# Patient Record
Sex: FEMALE | Race: WHITE | NOT HISPANIC OR LATINO | URBAN - METROPOLITAN AREA
[De-identification: names, ages, dates, MRNs, and addresses within clinical notes are randomized per-mention and may not be internally consistent; named-entity substitution may affect disease eponyms.]

---

## 2025-05-14 ENCOUNTER — APPOINTMENT (OUTPATIENT)
Dept: OBSTETRICS AND GYNECOLOGY | Facility: CLINIC | Age: 25
End: 2025-05-14

## 2025-05-14 VITALS — BODY MASS INDEX: 39.78 KG/M2 | DIASTOLIC BLOOD PRESSURE: 65 MMHG | WEIGHT: 221 LBS | SYSTOLIC BLOOD PRESSURE: 112 MMHG

## 2025-05-14 DIAGNOSIS — Z36.4 ULTRASOUND FOR ANTENATAL SCREENING FOR FETAL GROWTH RESTRICTION (HHS-HCC): ICD-10-CM

## 2025-05-14 DIAGNOSIS — O36.80X0 PREGNANCY WITH INCONCLUSIVE FETAL VIABILITY, SINGLE OR UNSPECIFIED FETUS: ICD-10-CM

## 2025-05-14 DIAGNOSIS — N91.2 AMENORRHEA: ICD-10-CM

## 2025-05-14 DIAGNOSIS — Z32.01 PREGNANCY TEST POSITIVE (HHS-HCC): ICD-10-CM

## 2025-05-14 DIAGNOSIS — Z34.82 PRENATAL CARE, SUBSEQUENT PREGNANCY IN SECOND TRIMESTER: ICD-10-CM

## 2025-05-14 DIAGNOSIS — O99.210 OBESITY IN PREGNANCY (HHS-HCC): Primary | ICD-10-CM

## 2025-05-14 LAB
POC BLOOD, URINE: NEGATIVE
POC GLUCOSE, URINE: NEGATIVE MG/DL
POC LEUKOCYTES, URINE: NEGATIVE
POC NITRITE,URINE: NEGATIVE
POC PROTEIN, URINE: NEGATIVE MG/DL
PREGNANCY TEST URINE, POC: POSITIVE

## 2025-05-14 PROCEDURE — 81025 URINE PREGNANCY TEST: CPT | Performed by: OBSTETRICS & GYNECOLOGY

## 2025-05-14 PROCEDURE — 99204 OFFICE O/P NEW MOD 45 MIN: CPT | Performed by: OBSTETRICS & GYNECOLOGY

## 2025-05-14 PROCEDURE — 76816 OB US FOLLOW-UP PER FETUS: CPT | Performed by: OBSTETRICS & GYNECOLOGY

## 2025-05-14 NOTE — PROGRESS NOTES
"Subjective   Patient ID: Abbey Frank is a 24 y.o. female who presents for confirmation of pregnancy.  New patient 24 years old.  Here with her partner.  Confirmation of pregnancy fetal viability establishment of due date and pregnancy recommendations    First of her last menses was .  She had an ultrasound done at [facility] end of April she has video but they did not do measurements.  Based on my visualization of the video she looks to be about 10 to 12 weeks at that time.  I did not ultrasound today she measuring between 13.4 and 13.6 weeks based on biparietal diameter and femur length.  Due date established for     Will review pregnancy recommendations.  Start baby aspirin once daily along with prenatal vitamins to minimize risk of preeclampsia obtain prenatal blood work.  Counseled on noninvasive prenatal testing.  Reviewed the importance of limiting weight gain.        Review of Systems   All other systems reviewed and are negative.      Objective   Physical Exam  Constitutional:       Appearance: Normal appearance. She is obese.   Neurological:      Mental Status: She is alert.         Assessment/Plan   Confirmation of fetal viability review pregnancy recommendations obtain prenatal blood work.  Consider noninvasive prenatal testing.         Ronald Fuller MD 25 1:35 PM Subjective   Patient ID [id number]   Abbye Frank is a 24 y.o.  at 13w6d with a working estimated date of delivery of 2025, by Ultrasound who presents for a routine prenatal visit. She denies vaginal bleeding, leakage of fluid, decreased fetal movements, and contractions.    Her pregnancy is complicated by: BMI greater than 30.    Objective   Physical Exam  Weight: 100 kg (221 lb), Pregravid BMI: Could not be calculated  Expected Total Weight Gain: Could not be calculated   BP: 112/65         Prenatal Labs  Urine dip:  No results found for: \"KETONESU\", \"GLUCOSEUR\", \"LEUKOCYTESUR\"  No results found " "for: \"HGB\", \"HCT\", \"LABPLAT\", \"ABO\", \"LABRHF\", \"LABANTI\", \"LABRPR\", \"HEPBSAG\", \"HIVAB\"  No results found for: \"PAPPA\", \"AFP\", \"HCG\", \"ESTRIOL\", \"INHBA\"    Imaging  The most recent ultrasound was performed on   with a study GA of  .          Assessment/Plan       Continue prenatal vitamin.  Labs reviewed.  Order placed for anatomy scan at 20 weeks.  Prenatal blood work.  Counseled on noninvasive prenatal testing to review pregnancy recommendations.  Start baby aspirin  Follow up in 4 weeks for a routine prenatal visit.  "

## 2025-05-16 LAB — BACTERIA UR CULT: NORMAL

## 2025-05-18 LAB
AMPHETAMINES UR QL: NEGATIVE NG/ML
BACTERIA UR CULT: NORMAL
BARBITURATES UR QL: NEGATIVE NG/ML
BENZODIAZ UR QL: NEGATIVE NG/ML
BZE UR QL: NEGATIVE NG/ML
C TRACH RRNA SPEC QL NAA+PROBE: NOT DETECTED
CODEINE UR-MCNC: 385 NG/ML
CREAT UR-MCNC: 221 MG/DL
DRUG SCREEN COMMENT UR-IMP: ABNORMAL
FENTANYL UR QL SCN: NEGATIVE NG/ML
HYDROCODONE UR-MCNC: NEGATIVE NG/ML
HYDROMORPHONE UR-MCNC: NEGATIVE NG/ML
METHADONE UR QL: NEGATIVE NG/ML
MORPHINE UR-MCNC: 55 NG/ML
N GONORRHOEA RRNA SPEC QL NAA+PROBE: NOT DETECTED
NORHYDROCODONE UR CFM-MCNC: NEGATIVE NG/ML
OPIATES UR QL: POSITIVE NG/ML
OXIDANTS UR QL: NEGATIVE MCG/ML
OXYCODONE UR QL: NEGATIVE NG/ML
PCP UR QL: NEGATIVE NG/ML
PH UR: 5.6 [PH] (ref 4.5–9)
QUEST GC CT AMPLIFIED (ALWAYS MESSAGE): NORMAL
QUEST NOTES AND COMMENTS: ABNORMAL
THC UR QL: NEGATIVE NG/ML

## 2025-05-27 ENCOUNTER — TELEPHONE (OUTPATIENT)
Dept: OBSTETRICS AND GYNECOLOGY | Facility: CLINIC | Age: 25
End: 2025-05-27
Payer: MEDICAID

## 2025-05-28 ENCOUNTER — LAB (OUTPATIENT)
Dept: LAB | Facility: HOSPITAL | Age: 25
End: 2025-05-28
Payer: MEDICAID

## 2025-05-28 DIAGNOSIS — Z34.82 ENCOUNTER FOR SUPERVISION OF OTHER NORMAL PREGNANCY, SECOND TRIMESTER: Primary | ICD-10-CM

## 2025-05-28 LAB
ABO GROUP (TYPE) IN BLOOD: NORMAL
ANTIBODY SCREEN: NORMAL
ERYTHROCYTE [DISTWIDTH] IN BLOOD BY AUTOMATED COUNT: 12.9 % (ref 11.5–14.5)
HBV SURFACE AG SERPL QL IA: NONREACTIVE
HCT VFR BLD AUTO: 34 % (ref 36–46)
HCV AB SER QL: NONREACTIVE
HGB BLD-MCNC: 11.4 G/DL (ref 12–16)
HIV 1+2 AB+HIV1 P24 AG SERPL QL IA: NONREACTIVE
MCH RBC QN AUTO: 29.2 PG (ref 26–34)
MCHC RBC AUTO-ENTMCNC: 33.5 G/DL (ref 32–36)
MCV RBC AUTO: 87 FL (ref 80–100)
NRBC BLD-RTO: 0 /100 WBCS (ref 0–0)
PLATELET # BLD AUTO: 221 X10*3/UL (ref 150–450)
RBC # BLD AUTO: 3.9 X10*6/UL (ref 4–5.2)
REFLEX ADDED, ANEMIA PANEL: NORMAL
RH FACTOR (ANTIGEN D): NORMAL
RUBV IGG SERPL IA-ACNC: 1.1 IA
RUBV IGG SERPL QL IA: POSITIVE
TREPONEMA PALLIDUM IGG+IGM AB [PRESENCE] IN SERUM OR PLASMA BY IMMUNOASSAY: NONREACTIVE
WBC # BLD AUTO: 8.1 X10*3/UL (ref 4.4–11.3)

## 2025-05-28 PROCEDURE — 86901 BLOOD TYPING SEROLOGIC RH(D): CPT

## 2025-05-28 PROCEDURE — 86900 BLOOD TYPING SEROLOGIC ABO: CPT

## 2025-05-28 PROCEDURE — 85027 COMPLETE CBC AUTOMATED: CPT

## 2025-05-28 PROCEDURE — 86762 RUBELLA ANTIBODY: CPT

## 2025-05-28 PROCEDURE — 86780 TREPONEMA PALLIDUM: CPT

## 2025-05-28 PROCEDURE — 87340 HEPATITIS B SURFACE AG IA: CPT

## 2025-05-28 PROCEDURE — 87389 HIV-1 AG W/HIV-1&-2 AB AG IA: CPT

## 2025-05-28 PROCEDURE — 86850 RBC ANTIBODY SCREEN: CPT

## 2025-05-28 PROCEDURE — 36415 COLL VENOUS BLD VENIPUNCTURE: CPT

## 2025-05-28 PROCEDURE — 86803 HEPATITIS C AB TEST: CPT

## 2025-05-30 LAB
# FETUSES US: NORMAL
AFP ADJ MOM SERPL: NORMAL
AFP INTERP SERPL-IMP: NORMAL
AFP SERPL-MCNC: NORMAL NG/ML
AGE: NORMAL
CLINICAL INFO: NORMAL
DONATED EGG PATIENT QL: NORMAL
EST. AVERAGE GLUCOSE BLD GHB EST-MCNC: 94 MG/DL
EST. AVERAGE GLUCOSE BLD GHB EST-SCNC: 5.2 MMOL/L
GA CLIN EST: NORMAL WK
GA METHOD: NORMAL
HBA1C MFR BLD: 4.9 %
HX OF NTD NARR: NORMAL
HX OF TRISOMY 21 NARR: NORMAL
IDDM PATIENT QL: NORMAL
NEURAL TUBE DEFECT RISK FETUS: NORMAL %
SERVICE CMNT-IMP: NORMAL
SERVICE CMNT-IMP: NORMAL

## 2025-06-05 LAB
# FETUSES US: 1
AFP ADJ MOM SERPL: 0.53
AFP INTERP SERPL-IMP: NORMAL
AFP SERPL-MCNC: 13.8 NG/ML
AGE: NORMAL
CLINICAL INFO: NO
DONATED EGG PATIENT QL: NO
EST. AVERAGE GLUCOSE BLD GHB EST-MCNC: 94 MG/DL
EST. AVERAGE GLUCOSE BLD GHB EST-SCNC: 5.2 MMOL/L
GA CLIN EST: 15.9 WEEKS
GA METHOD: NORMAL
HBA1C MFR BLD: 4.9 %
HX OF NTD NARR: NO
HX OF TRISOMY 21 NARR: NO
IDDM PATIENT QL: NO
NEURAL TUBE DEFECT RISK FETUS: NORMAL %
SERVICE CMNT-IMP: NORMAL
SERVICE CMNT-IMP: NORMAL

## 2025-06-12 PROBLEM — Z3A.18 18 WEEKS GESTATION OF PREGNANCY (HHS-HCC): Status: ACTIVE | Noted: 2025-06-12

## 2025-06-14 ENCOUNTER — APPOINTMENT (OUTPATIENT)
Dept: OBSTETRICS AND GYNECOLOGY | Facility: CLINIC | Age: 25
End: 2025-06-14
Payer: MEDICAID

## 2025-06-14 VITALS — BODY MASS INDEX: 40.68 KG/M2 | DIASTOLIC BLOOD PRESSURE: 71 MMHG | WEIGHT: 226 LBS | SYSTOLIC BLOOD PRESSURE: 111 MMHG

## 2025-06-14 DIAGNOSIS — Z3A.18 18 WEEKS GESTATION OF PREGNANCY (HHS-HCC): Primary | ICD-10-CM

## 2025-06-14 DIAGNOSIS — Z3A.20 20 WEEKS GESTATION OF PREGNANCY (HHS-HCC): ICD-10-CM

## 2025-06-14 DIAGNOSIS — Z34.82 PRENATAL CARE, SUBSEQUENT PREGNANCY IN SECOND TRIMESTER: ICD-10-CM

## 2025-06-14 PROCEDURE — 99213 OFFICE O/P EST LOW 20 MIN: CPT | Performed by: MIDWIFE

## 2025-06-14 NOTE — PROGRESS NOTES
"Subjective   Patient ID [id number]   Abbey Frank is a 24 y.o.  at 18w2d with a working estimated date of delivery of 2025, by Ultrasound who presents for a routine prenatal visit. She denies vaginal bleeding, leakage of fluid, decreased fetal movements, or contractions.    Her pregnancy is complicated by:  BMI greater than 30.  Weekly NST 34 weeks; Toxicology screening 25 positive for opiates/codeine/morphine.  Blood type A-.  Female.  RHD detected for RhoGAM    Objective   Physical Exam  Weight: 103 kg (226 lb)  Expected Total Weight Gain: Could not be calculated   Pregravid BMI: Could not be calculated  BP: 111/71  Fetal Heart Rate: 147      Prenatal Labs  Urine dip:  No results found for: \"KETONESU\", \"GLUCOSEUR\", \"LEUKOCYTESUR\"    Lab Results   Component Value Date    HGB 11.4 (L) 2025    HCT 34.0 (L) 2025    ABO A 2025    HEPBSAG Nonreactive 2025     Lab Results   Component Value Date    AFP 13.8 2025     No results found for: \"GLUF\", \"GLUT1\", \"USZAVCK5BP\", \"DBOAENW5NV\"    Imaging  The most recent ultrasound was performed on The most recent ultrasound study is not finalized with a study GA of The most recent ultrasound study is not finalized and EFW of The most recent ultrasound study is not finalized.  The most recent ultrasound study is not finalized  The most recent ultrasound study is not finalized    Assessment/Plan   Diagnoses and all orders for this visit:  18 weeks gestation of pregnancy ([facility])  20 weeks gestation of pregnancy ([facility])  -     US MAC OB imaging order; Future  Prenatal care, subsequent pregnancy in second trimester      Continue prenatal vitamin.  Labs reviewed.  RhoGAM to be given at 28 wks  20 wk anatomy US to be scheduled  Warning s/sx reviewed  Follow up in 4 weeks for a routine prenatal visit.  "

## 2025-06-30 ENCOUNTER — HOSPITAL ENCOUNTER (OUTPATIENT)
Dept: RADIOLOGY | Facility: CLINIC | Age: 25
Discharge: HOME | End: 2025-06-30
Payer: MEDICAID

## 2025-06-30 DIAGNOSIS — Z3A.20 20 WEEKS GESTATION OF PREGNANCY (HHS-HCC): ICD-10-CM

## 2025-06-30 PROCEDURE — 76811 OB US DETAILED SNGL FETUS: CPT | Performed by: STUDENT IN AN ORGANIZED HEALTH CARE EDUCATION/TRAINING PROGRAM

## 2025-06-30 PROCEDURE — 76811 OB US DETAILED SNGL FETUS: CPT

## 2025-07-14 ENCOUNTER — APPOINTMENT (OUTPATIENT)
Dept: OBSTETRICS AND GYNECOLOGY | Facility: CLINIC | Age: 25
End: 2025-07-14
Payer: MEDICAID

## 2025-07-14 VITALS — DIASTOLIC BLOOD PRESSURE: 72 MMHG | WEIGHT: 226 LBS | SYSTOLIC BLOOD PRESSURE: 112 MMHG | BODY MASS INDEX: 40.68 KG/M2

## 2025-07-14 DIAGNOSIS — O26.899 RH NEGATIVE STATE IN ANTEPARTUM PERIOD (HHS-HCC): ICD-10-CM

## 2025-07-14 DIAGNOSIS — Z67.91 RH NEGATIVE STATE IN ANTEPARTUM PERIOD (HHS-HCC): ICD-10-CM

## 2025-07-14 DIAGNOSIS — Z51.81 ENCOUNTER FOR THERAPEUTIC DRUG MONITORING: ICD-10-CM

## 2025-07-14 DIAGNOSIS — Z3A.22 22 WEEKS GESTATION OF PREGNANCY (HHS-HCC): Primary | ICD-10-CM

## 2025-07-14 DIAGNOSIS — Z34.83 PRENATAL CARE, SUBSEQUENT PREGNANCY IN THIRD TRIMESTER: ICD-10-CM

## 2025-07-14 LAB
POC BLOOD, URINE: NEGATIVE
POC GLUCOSE, URINE: NEGATIVE MG/DL
POC LEUKOCYTES, URINE: NEGATIVE
POC NITRITE,URINE: NEGATIVE
POC PROTEIN, URINE: NEGATIVE MG/DL

## 2025-07-14 PROCEDURE — 99213 OFFICE O/P EST LOW 20 MIN: CPT | Performed by: MIDWIFE

## 2025-07-14 RX ORDER — ASPIRIN 81 MG/1
81 TABLET ORAL DAILY
COMMUNITY

## 2025-07-14 RX ORDER — FERROUS SULFATE 325(65) MG
325 TABLET ORAL
COMMUNITY

## 2025-07-14 NOTE — PROGRESS NOTES
"Subjective   Patient ID [id number]   Abbey Frank is a 24 y.o.  at 22w4d with a working estimated date of delivery of 2025, by Ultrasound who presents for a routine prenatal visit. She denies vaginal bleeding, leakage of fluid, decreased fetal movements, or contractions.    Her pregnancy is complicated by:  BMI greater than 30.  Weekly NST 34 weeks; Toxicology screening 25 positive for opiates/codeine/morphine.  Blood type A-.  Female.  RHD detected for RhoGAM. Change toni to     Objective   Physical Exam  Weight: 103 kg (226 lb)  Expected Total Weight Gain: Could not be calculated   Pregravid BMI: Could not be calculated  BP: 112/72  Fetal Heart Rate: 134 Fundal Height (cm): 22 cm    Prenatal Labs  Urine dip:  No results found for: \"KETONESU\", \"GLUCOSEUR\", \"LEUKOCYTESUR\"    Lab Results   Component Value Date    HGB 11.4 (L) 2025    HCT 34.0 (L) 2025    ABO A 2025    HEPBSAG Nonreactive 2025     Lab Results   Component Value Date    AFP 13.8 2025     No results found for: \"GLUF\", \"GLUT1\", \"RUKFPTZ5GS\", \"TMEDGYH3KH\"    Imaging  The most recent ultrasound was performed on The most recent ultrasound study is not finalized with a study GA of The most recent ultrasound study is not finalized and EFW of The most recent ultrasound study is not finalized.  The most recent ultrasound study is not finalized  The most recent ultrasound study is not finalized    Assessment/Plan   Diagnoses and all orders for this visit:  22 weeks gestation of pregnancy ([facility])  Prenatal care, subsequent pregnancy in third trimester  -     Hemoglobin and Hematocrit, Blood; Future  -     Glucose, 1 Hour Screen, Pregnancy; Future  -     POCT UA (nonautomated) manually resulted  Rh negative state in antepartum period ([facility])  -     Type And Screen Is this order related to pregnancy or an upcoming surgery? Yes; Where will this surgery/delivery be performed? [facility]; What is the " date of the surgery? 11/13/2025; Has this patient ever had a transfusion? Unknown; Ha...; Future      Continue prenatal vitamin.  Labs reviewed.  GTT ordered with H/H.  Importance of daily FM, warning s/sx reviewed  Follow up in 4 weeks for a routine prenatal visit.

## 2025-07-15 LAB
AMPHETAMINES UR QL: NEGATIVE NG/ML
BARBITURATES UR QL: NEGATIVE NG/ML
BENZODIAZ UR QL: NEGATIVE NG/ML
BZE UR QL: NEGATIVE NG/ML
CREAT UR-MCNC: 132.1 MG/DL
FENTANYL UR QL SCN: NEGATIVE NG/ML
METHADONE UR QL: NEGATIVE NG/ML
OPIATES UR QL: NEGATIVE NG/ML
OXIDANTS UR QL: NEGATIVE MCG/ML
OXYCODONE UR QL: NEGATIVE NG/ML
PCP UR QL: NEGATIVE NG/ML
PH UR: 7.8 [PH] (ref 4.5–9)
QUEST NOTES AND COMMENTS: NORMAL
THC UR QL: NEGATIVE NG/ML

## 2025-07-21 ENCOUNTER — ANCILLARY PROCEDURE (OUTPATIENT)
Dept: RADIOLOGY | Age: 25
End: 2025-07-21
Payer: MEDICAID

## 2025-07-21 DIAGNOSIS — Z3A.20 20 WEEKS GESTATION OF PREGNANCY (HHS-HCC): ICD-10-CM

## 2025-07-21 PROCEDURE — 76816 OB US FOLLOW-UP PER FETUS: CPT

## 2025-07-21 PROCEDURE — 76816 OB US FOLLOW-UP PER FETUS: CPT | Performed by: MEDICAL GENETICS

## 2025-07-22 ENCOUNTER — TELEPHONE (OUTPATIENT)
Dept: OBSTETRICS AND GYNECOLOGY | Facility: HOSPITAL | Age: 25
End: 2025-07-22
Payer: MEDICAID

## 2025-07-25 DIAGNOSIS — Z34.83 PRENATAL CARE, SUBSEQUENT PREGNANCY IN THIRD TRIMESTER: ICD-10-CM

## 2025-07-25 DIAGNOSIS — Z67.91 RH NEGATIVE STATE IN ANTEPARTUM PERIOD (HHS-HCC): ICD-10-CM

## 2025-07-25 DIAGNOSIS — O26.899 RH NEGATIVE STATE IN ANTEPARTUM PERIOD (HHS-HCC): ICD-10-CM

## 2025-08-04 ENCOUNTER — OFFICE VISIT (OUTPATIENT)
Dept: PEDIATRIC CARDIOLOGY | Facility: HOSPITAL | Age: 25
End: 2025-08-04
Payer: MEDICAID

## 2025-08-04 ENCOUNTER — LAB (OUTPATIENT)
Dept: LAB | Facility: HOSPITAL | Age: 25
End: 2025-08-04
Payer: MEDICAID

## 2025-08-04 ENCOUNTER — HOSPITAL ENCOUNTER (OUTPATIENT)
Dept: PEDIATRIC CARDIOLOGY | Facility: HOSPITAL | Age: 25
Discharge: HOME | End: 2025-08-04
Payer: MEDICAID

## 2025-08-04 VITALS
BODY MASS INDEX: 41.21 KG/M2 | WEIGHT: 232.59 LBS | SYSTOLIC BLOOD PRESSURE: 109 MMHG | HEART RATE: 94 BPM | OXYGEN SATURATION: 96 % | HEIGHT: 63 IN | DIASTOLIC BLOOD PRESSURE: 71 MMHG

## 2025-08-04 DIAGNOSIS — O26.899 OTHER SPECIFIED PREGNANCY RELATED CONDITIONS, UNSPECIFIED TRIMESTER (HHS-HCC): ICD-10-CM

## 2025-08-04 DIAGNOSIS — O35.9XX0 SUSPECTED FETAL ABNORMALITY AFFECTING MANAGEMENT OF MOTHER, SINGLE OR UNSPECIFIED FETUS (HHS-HCC): Primary | ICD-10-CM

## 2025-08-04 DIAGNOSIS — Z67.91 UNSPECIFIED BLOOD TYPE, RH NEGATIVE: Primary | ICD-10-CM

## 2025-08-04 DIAGNOSIS — O35.9XX1 SUSPECTED FETAL ANOMALY, ANTEPARTUM, FETUS 1 (HHS-HCC): ICD-10-CM

## 2025-08-04 DIAGNOSIS — O35.8XX0 MATERNAL CARE FOR OTHER (SUSPECTED) FETAL ABNORMALITY AND DAMAGE, NOT APPLICABLE OR UNSPECIFIED (HHS-HCC): ICD-10-CM

## 2025-08-04 LAB — BODY SURFACE AREA: 2.17 M2

## 2025-08-04 PROCEDURE — 93325 DOPPLER ECHO COLOR FLOW MAPG: CPT | Performed by: PEDIATRICS

## 2025-08-04 PROCEDURE — 3008F BODY MASS INDEX DOCD: CPT | Performed by: PEDIATRICS

## 2025-08-04 PROCEDURE — 76827 ECHO EXAM OF FETAL HEART: CPT | Performed by: PEDIATRICS

## 2025-08-04 PROCEDURE — 76825 ECHO EXAM OF FETAL HEART: CPT

## 2025-08-04 PROCEDURE — 86901 BLOOD TYPING SEROLOGIC RH(D): CPT | Performed by: OBSTETRICS & GYNECOLOGY

## 2025-08-04 PROCEDURE — 76825 ECHO EXAM OF FETAL HEART: CPT | Performed by: PEDIATRICS

## 2025-08-04 PROCEDURE — 99202 OFFICE O/P NEW SF 15 MIN: CPT

## 2025-08-04 PROCEDURE — 99244 OFF/OP CNSLTJ NEW/EST MOD 40: CPT | Performed by: PEDIATRICS

## 2025-08-04 NOTE — PATIENT INSTRUCTIONS
On fetal echocardiogram today the structure, size, function (squeeze), and rhythm of your fetus' heart were normal.  There are some limitations to fetal echocardiogram as described below, and because it is not a perfect test it is important to know that we cannot rule out all possible heart problems (see below).  We will communicate these results with your obstetrician.    It was a pleasure to see you today.  We do not need to see you back for routine follow-up during the remainder of your pregnancy.  However, we would be happy to see you back if new issues or concerns arise.  After birth your baby does not need to see a cardiologist unless the pediatric team has concerns.  If you have any questions or concerns regarding this evaluation, please do not hesitate to contact me.    Valery Wyman MD   of Pediatrics  Division of Pediatric Cardiology  [place] [address]  Phone: [phone]  Fax: [phone]  e-mail: [email or web address]    [email or web address]    Normal Fetal Echocardiogram    Today you had an ultrasound of your fetus' heart (fetal echocardiogram).  Based on all of the pictures taken today, the heart appears normal and is developing as expected for this point in your pregnancy.   Therefore, no further testing is recommended at this time.    Despite today´s normal findings, it is impossible to rule out all heart problems before birth.  This is partially because the fetus´ heart is so small, and our machine´s technology can only see structures down to a certain size.  It is also because blood flow in a fetus is different and more complicated than blood flow after birth.    Briefly:  ° Fetuses get oxygen from the placenta instead of their lungs.  High-oxygen (red) blood from the placenta comes back to the right side of the fetus´  heart.  ° Red blood crosses to the left side of the heart through a hole between the upper chambers of the heart, the foramen ovale.  The foramen ovale lets the red blood flow to the body.  ° Low-oxygen (blue) blood stays on the right side of the heart.  After leaving the heart, the blue blood flows through a special blood vessel known as the ductus arteriosus.  The ductus arteriosus allows the blue blood to bypass the lungs and return to the placenta to get oxygen.  ° After birth the foramen ovale and ductus arteriosus should close, but sometimes they do not.  It is impossible to predict in which children these structures will remain open.    ° Thus, on fetal echo we cannot rule out that your baby will have a patent foramen ovale (PFO) or patent ductus arteriosus (PDA) after birth.    In addition, fetal echocardiography can miss other heart defects including:  ° Small or medium-sized holes between the upper or lower heart chambers.  ° Minor abnormalities of the heart valves.  ° Narrowing of the main artery that goes to the body (coarctation of the aorta).  ° Other rare abnormalities of the veins or arteries.    If any of these defects are present, there should be findings after birth that will alert your child´s doctor that there is a problem and prompt further evaluation.  After delivery, if your pediatrician has any concerns, your child's heart can be reevaluated at that time.

## 2025-08-04 NOTE — PROGRESS NOTES
[facility]  [facility]  Division of Pediatric Cardiology  [facility]  [address], [address], [place] [address]  Tel: [phone], Fax [phone]      Obstetrician: Dr. Ronald Fuller     Abbey Frank was seen at the request of Dr. Hillary Llanes for incomplete cardiac views on obstetric ultrasound.  Records were reviewed, and a summary of those records is integrated within the history of present illness.  A report with my findings is being sent via written or electronic means to the referring physician with my recommendations.    Accompanied by: friend   History obtained from: patient    History of Presentation   History of Present Illness:   Abbey Frank is a 24 y.o. female presenting for initial prenatal cardiology consultation and fetal echocardiogram for incomplete cardiac views on obstetric ultrasound.  She is  and approximately 25w4d weeks pregnant (No LMP recorded. Patient is pregnant., Estimated Date of Delivery: 25) with a Female fetus.  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  Complications of her current pregnancy include obesity (BMI 41).  Her level 2 obstetric ultrasound for anatomy was performed at 19 weeks gestational age and demonstrated incomplete 4 chamber heart, outflow tracts, 3 vessel view, nose/lips, left diaphragm and bilateral angles. A repeat obstetric ultrasound at 22 weeks was completed except for 4 chamber and outflow tract views and was normal. She has undergone cell free fetal DNA testing and it was normal.  She has not had invasive genetic testing such as amniocentesis or chorionic villous sampling.  This pregnancy was not the result of in vitro fertilization.  She was not using potentially teratogenic medication at the time of conception.  There have been no other complications of this pregnancy.  Ms. Potter  "NANI Frank plans to deliver her baby at [facility].    Ms. Abbey Frank feels well today.  She denies any shortness of breath, abdominal cramping, contractions, bleeding, or swelling of the extremities.  She notes frequent fetal movement.      Medical History     Medical Conditions:  Problem List[1]  Past Surgeries:  Surgical History[2]    Current Outpatient Medications   Medication Instructions    aspirin 81 mg, Daily    ferrous sulfate 325 mg (65 mg elemental) tablet 325 mg of ferrous sulfate, Daily with breakfast    prenatal no115/iron/folic acid (PRENATAL 19 ORAL) Take by mouth.      Allergies:  Patient has no known allergies.    Social History:  Patient lives with significant other.  Occupation: Not employed   Smoking: None  Alcohol: None  Drug Use: None  She wears a seatbelt while in the car. She denies any verbal, sexual or physical abuse.     Cardiac Family History (for patient and father of baby):  Her mother has a history of an arrhythmia. There is no history of congenital heart disease.  There is no history of early sudden/unexplained death including SIDS and drowning.  There is no history of cardiomyopathy of any type or heart transplant.  There is no history of pacemaker/defibrillator or arrhythmia syndromes, including Long QT syndrome, Dulce Maria-Parkinson-White syndrome or Brugada syndrome.  There is no history of heart attack or stroke before the age of 55 years in a close family member.  There is no history of Marfan syndrome or aortic aneurysm.  There is no history of deafness.  There is no history of syncope/fainting.  There is no history of high blood pressure or high cholesterol.  There is no history of DiGeorge Syndrome (22q11).    Physical Examination     /71 (BP Location: Right arm, Patient Position: Sitting)   Pulse 94   Ht 1.61 m (5' 3.39\")   Wt 105 kg (232 lb 9.4 oz)   SpO2 96%   BMI 40.70 kg/m²     General: Alert, well-appearing and in no acute distress.    Abdomen: " Soft, nontender, not distended. Gravid.  Extremities: No swelling or edema.  Neurologic / Psychiatric: Grossly intact without focal deficits.  Appropriate demeanor.      Results     Fetal Echocardiogram:    I ordered and interpreted a transabdominal fetal echocardiogram.  I performed a portion of the study myself.  The complete report is available under separate cover.  The results are summarized as follows:    Image quality: Good  Cardiac situs: Cardiac mass in the left chest.  Left ventricular apex points leftward.  Segmental anatomy: Atrio-ventricular concordance.  Ventriculo-arterial concordance.  Normally-related great arteries.  Superior vena cava: Normal connection to the right atrium.  Inferior vena cava: Normal connection to the right atrium.  Pulmonary veins: At least one pulmonary vein connects normally to the left atrium.  Atrial septum: Patent foramen ovale, with flap valve bowing into the left atrium.  Tricuspid valve: Structurally normal.  No obvious stenosis or insufficiency.  Mitral valve: Structurally normal.  No obvious stenosis or insufficiency.  Right ventricle: Normal ventricular size, wall thickness, and systolic function (qualitative).  Left ventricle: Normal ventricular size, wall thickness, and systolic function (qualitative).  Interventricular septum: No obvious septal defect.  Aortic valve: Structurally normal.  No obvious stenosis or insufficiency.  Pulmonary valve: Structurally normal.  No obvious stenosis or insufficiency.  Pulmonary artery: Normal in size.  Ductal arch: Patent with right to left shunting.  Aortic arch: Left-sided.  Patent.  Pericardial effusion: None.  Umbilical arteries: Two umbilical arteries.  Normal arterial flow pattern.  Umbilical vein: Normal venous flow pattern.  Electrophysiology: Normal fetal heart rate and rhythm.  Normal mechanical AL interval.      Assessment & Plan   Assessment:  Ms. Abbey Frank is a 24 y.o. female who is  and currently at  25w4d weeks gestational age with a female fetus.  A fetal echocardiogram was performed today for incomplete cardiac views on obstetric ultrasound.     Fetal echocardiogram today demonstrated grossly normal fetal cardiac anatomy, qualitatively normal fetal cardiac function, normal fetal heart rhythm, and no evidence of in utero congestive heart failure or hydrops fetalis.  I reviewed the results of today's evaluation, including the findings of the fetal echocardiogram, with Ms. Abbey Frank in detail.      I discussed limitations of the technology of fetal echocardiography with Ms. Abbey Frank in detail.  I explained that fetal echocardiography cannot exclude all forms of congenital heart disease.  Fetal echocardiography may be insensitive to some defects of atrial and ventricular septation, minor valvular abnormalities, partial anomalous pulmonary venous return, and coarctation of the aorta.  In addition, normal fetal echocardiogram does not ensure that the fetal ductus arteriosus or foramen ovale will close.      Recommendations:  No changes to prenatal care.    Further fetal cardiac imaging is not required at this time.  We would be happy to see Ms. Abbey Frank in the future if new concerns arise.    Triage code 0:        No changes to delivery planning.  Delivery per obstetrics at patient´s preferred hospital.  Standard  care per  team.        No pediatric cardiology consult needed after birth unless there are concerns/issues.    I spent greater than 60 minutes in performance of this consultation, of which greater than 50% was related to coordination of care or counseling.    This assessment and plan, in addition to the results of relevant testing were explained to Abbey. All questions were answered and understanding was demonstrated.    It was a pleasure to see Ms. Abbey Frank today.  If you have any questions or concerns regarding this evaluation, do not hesitate to contact me.       Valery Wyman MD, FACC, FAAP   of Pediatrics  Division of Pediatric Cardiology  [place] [address]  Phone: [phone]  Fax: [phone]  e-mail: [email or web address]         [1]   Patient Active Problem List  Diagnosis    18 weeks gestation of pregnancy ([facility])   [2] No past surgical history on file.

## 2025-08-04 NOTE — LETTER
2025     Hillary Llanes MD  [address] [place] [address]    Patient: Abbey Frank   YOB: 2000   Date of Visit: 2025       Dear Dr. Hillary Llanes MD:    Thank you for referring Abbey Frank to me for evaluation. Below are my notes for this consultation.  If you have questions, please do not hesitate to call me. I look forward to following your patient along with you.       Sincerely,     Valery Wyman MD      CC: Sunni Galvin, APRN-CNM, ND  Bianca Pond, APRN-CNP  Khushboo Fleming, GERRI-CN, DNP  ______________________________________________________________________________________         [facility]  [facility]  Division of Pediatric Cardiology  [facility]  [id number] [place] [address], [address] [place] [address]  Tel: [phone], Fax [phone]      Obstetrician: Dr. Ronald Fuller     Abbey Frank was seen at the request of Dr. Hillary Llanes for incomplete cardiac views on obstetric ultrasound.  Records were reviewed, and a summary of those records is integrated within the history of present illness.  A report with my findings is being sent via written or electronic means to the referring physician with my recommendations.    Accompanied by: friend   History obtained from: patient    History of Presentation   History of Present Illness:   Abbey Frank is a 24 y.o. female presenting for initial prenatal cardiology consultation and fetal echocardiogram for incomplete cardiac views on obstetric ultrasound.  She is  and approximately 25w4d weeks pregnant (No LMP recorded. Patient is pregnant., Estimated Date of Delivery: 25) with a Female fetus.  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  Complications of her current pregnancy include obesity (BMI 41).  Her level 2 obstetric ultrasound for anatomy was  performed at 19 weeks gestational age and demonstrated incomplete 4 chamber heart, outflow tracts, 3 vessel view, nose/lips, left diaphragm and bilateral angles. A repeat obstetric ultrasound at 22 weeks was completed except for 4 chamber and outflow tract views and was normal. She has undergone cell free fetal DNA testing and it was normal.  She has not had invasive genetic testing such as amniocentesis or chorionic villous sampling.  This pregnancy was not the result of in vitro fertilization.  She was not using potentially teratogenic medication at the time of conception.  There have been no other complications of this pregnancy.  Ms. Abbey Frank plans to deliver her baby at [facility].    Ms. Abbey Frank feels well today.  She denies any shortness of breath, abdominal cramping, contractions, bleeding, or swelling of the extremities.  She notes frequent fetal movement.      Medical History     Medical Conditions:  Problem List[1]  Past Surgeries:  Surgical History[2]    Current Outpatient Medications   Medication Instructions   • aspirin 81 mg, Daily   • ferrous sulfate 325 mg (65 mg elemental) tablet 325 mg of ferrous sulfate, Daily with breakfast   • prenatal no115/iron/folic acid (PRENATAL 19 ORAL) Take by mouth.      Allergies:  Patient has no known allergies.    Social History:  Patient lives with significant other.  Occupation: Not employed   Smoking: None  Alcohol: None  Drug Use: None  She wears a seatbelt while in the car. She denies any verbal, sexual or physical abuse.     Cardiac Family History (for patient and father of baby):  Her mother has a history of an arrhythmia. There is no history of congenital heart disease.  There is no history of early sudden/unexplained death including SIDS and drowning.  There is no history of cardiomyopathy of any type or heart transplant.  There is no history of pacemaker/defibrillator or arrhythmia syndromes, including Long QT syndrome,  "Dulce Maria-Parkinson-White syndrome or Brugada syndrome.  There is no history of heart attack or stroke before the age of 55 years in a close family member.  There is no history of Marfan syndrome or aortic aneurysm.  There is no history of deafness.  There is no history of syncope/fainting.  There is no history of high blood pressure or high cholesterol.  There is no history of DiGeorge Syndrome (22q11).    Physical Examination     /71 (BP Location: Right arm, Patient Position: Sitting)   Pulse 94   Ht 1.61 m (5' 3.39\")   Wt 105 kg (232 lb 9.4 oz)   SpO2 96%   BMI 40.70 kg/m²     General: Alert, well-appearing and in no acute distress.    Abdomen: Soft, nontender, not distended. Gravid.  Extremities: No swelling or edema.  Neurologic / Psychiatric: Grossly intact without focal deficits.  Appropriate demeanor.      Results     Fetal Echocardiogram:    I ordered and interpreted a transabdominal fetal echocardiogram.  I performed a portion of the study myself.  The complete report is available under separate cover.  The results are summarized as follows:    Image quality: Good  Cardiac situs: Cardiac mass in the left chest.  Left ventricular apex points leftward.  Segmental anatomy: Atrio-ventricular concordance.  Ventriculo-arterial concordance.  Normally-related great arteries.  Superior vena cava: Normal connection to the right atrium.  Inferior vena cava: Normal connection to the right atrium.  Pulmonary veins: At least one pulmonary vein connects normally to the left atrium.  Atrial septum: Patent foramen ovale, with flap valve bowing into the left atrium.  Tricuspid valve: Structurally normal.  No obvious stenosis or insufficiency.  Mitral valve: Structurally normal.  No obvious stenosis or insufficiency.  Right ventricle: Normal ventricular size, wall thickness, and systolic function (qualitative).  Left ventricle: Normal ventricular size, wall thickness, and systolic function " (qualitative).  Interventricular septum: No obvious septal defect.  Aortic valve: Structurally normal.  No obvious stenosis or insufficiency.  Pulmonary valve: Structurally normal.  No obvious stenosis or insufficiency.  Pulmonary artery: Normal in size.  Ductal arch: Patent with right to left shunting.  Aortic arch: Left-sided.  Patent.  Pericardial effusion: None.  Umbilical arteries: Two umbilical arteries.  Normal arterial flow pattern.  Umbilical vein: Normal venous flow pattern.  Electrophysiology: Normal fetal heart rate and rhythm.  Normal mechanical W[place] interval.      Assessment & Plan   Assessment:  Ms. Abbey Frank is a 24 y.o. female who is  and currently at 25w4d weeks gestational age with a female fetus.  A fetal echocardiogram was performed today for incomplete cardiac views on obstetric ultrasound.     Fetal echocardiogram today demonstrated grossly normal fetal cardiac anatomy, qualitatively normal fetal cardiac function, normal fetal heart rhythm, and no evidence of in utero congestive heart failure or hydrops fetalis.  I reviewed the results of today's evaluation, including the findings of the fetal echocardiogram, with Ms. Abbey Frank in detail.      I discussed limitations of the technology of fetal echocardiography with Ms. Abbey Frank in detail.  I explained that fetal echocardiography cannot exclude all forms of congenital heart disease.  Fetal echocardiography may be insensitive to some defects of atrial and ventricular septation, minor valvular abnormalities, partial anomalous pulmonary venous return, and coarctation of the aorta.  In addition, normal fetal echocardiogram does not ensure that the fetal ductus arteriosus or foramen ovale will close.      Recommendations:  No changes to prenatal care.    Further fetal cardiac imaging is not required at this time.  We would be happy to see Ms. Abbey Frank in the future if new concerns arise.    Triage code 0:        No changes  to delivery planning.  Delivery per obstetrics at patient´s preferred hospital.  Standard  care per  team.        No pediatric cardiology consult needed after birth unless there are concerns/issues.    I spent greater than 60 minutes in performance of this consultation, of which greater than 50% was related to coordination of care or counseling.    This assessment and plan, in addition to the results of relevant testing were explained to Abbey. All questions were answered and understanding was demonstrated.    It was a pleasure to see Ms. Abbey Frank today.  If you have any questions or concerns regarding this evaluation, do not hesitate to contact me.      Valery Wyman MD, FACC, FAAP   of Pediatrics  Division of Pediatric Cardiology  [place] [address]  Phone: [phone]  Fax: [phone]  e-mail: [email or web address]             [1]  Patient Active Problem List  Diagnosis   • 18 weeks gestation of pregnancy ([facility])   [2]  No past surgical history on file.

## 2025-08-05 LAB
ABO GROUP (TYPE) IN BLOOD: NORMAL
ANTIBODY SCREEN: NORMAL
GLUCOSE 1H P 50 G GLC PO SERPL-MCNC: 141 MG/DL
HCT VFR BLD AUTO: 31.6 % (ref 35–45)
HGB BLD-MCNC: 10.4 G/DL (ref 11.7–15.5)
RH FACTOR (ANTIGEN D): NORMAL

## 2025-08-11 ENCOUNTER — APPOINTMENT (OUTPATIENT)
Dept: OBSTETRICS AND GYNECOLOGY | Facility: CLINIC | Age: 25
End: 2025-08-11
Payer: MEDICAID

## 2025-08-11 VITALS — WEIGHT: 228 LBS | SYSTOLIC BLOOD PRESSURE: 112 MMHG | DIASTOLIC BLOOD PRESSURE: 62 MMHG | BODY MASS INDEX: 39.9 KG/M2

## 2025-08-11 DIAGNOSIS — Z34.83 PRENATAL CARE, SUBSEQUENT PREGNANCY IN THIRD TRIMESTER: ICD-10-CM

## 2025-08-11 PROBLEM — Z3A.25 25 WEEKS GESTATION OF PREGNANCY (HHS-HCC): Status: ACTIVE | Noted: 2025-06-12

## 2025-08-11 PROBLEM — Z3A.27 27 WEEKS GESTATION OF PREGNANCY (HHS-HCC): Status: ACTIVE | Noted: 2025-06-12

## 2025-08-11 LAB
POC BLOOD, URINE: NEGATIVE
POC GLUCOSE, URINE: NEGATIVE MG/DL
POC LEUKOCYTES, URINE: NEGATIVE
POC NITRITE,URINE: NEGATIVE
POC PROTEIN, URINE: ABNORMAL MG/DL

## 2025-08-11 PROCEDURE — 99213 OFFICE O/P EST LOW 20 MIN: CPT | Performed by: OBSTETRICS & GYNECOLOGY

## 2025-08-26 LAB
GLUCOSE 1H P CHAL SERPL-MCNC: 165 MG/DL
GLUCOSE 2H P CHAL SERPL-MCNC: 150 MG/DL
GLUCOSE 3H P 100 G GLC PO SERPL-MCNC: 102 MG/DL
GLUCOSE P FAST SERPL-MCNC: 72 MG/DL (ref 65–94)
SERVICE CMNT-IMP: NORMAL

## 2025-09-08 ENCOUNTER — APPOINTMENT (OUTPATIENT)
Dept: OBSTETRICS AND GYNECOLOGY | Facility: CLINIC | Age: 25
End: 2025-09-08
Payer: MEDICAID

## 2025-09-17 ENCOUNTER — APPOINTMENT (OUTPATIENT)
Dept: OBSTETRICS AND GYNECOLOGY | Facility: CLINIC | Age: 25
End: 2025-09-17
Payer: MEDICAID

## 2025-09-22 ENCOUNTER — APPOINTMENT (OUTPATIENT)
Dept: OBSTETRICS AND GYNECOLOGY | Facility: CLINIC | Age: 25
End: 2025-09-22
Payer: MEDICAID

## 2025-10-06 ENCOUNTER — APPOINTMENT (OUTPATIENT)
Dept: OBSTETRICS AND GYNECOLOGY | Facility: CLINIC | Age: 25
End: 2025-10-06
Payer: MEDICAID